# Patient Record
Sex: MALE | Race: WHITE | URBAN - METROPOLITAN AREA
[De-identification: names, ages, dates, MRNs, and addresses within clinical notes are randomized per-mention and may not be internally consistent; named-entity substitution may affect disease eponyms.]

---

## 2023-01-05 ENCOUNTER — OFFICE VISIT (OUTPATIENT)
Dept: URBAN - METROPOLITAN AREA CLINIC 89 | Facility: CLINIC | Age: 63
End: 2023-01-05
Payer: COMMERCIAL

## 2023-01-05 DIAGNOSIS — H57.11 OCULAR PAIN, RIGHT EYE: Primary | ICD-10-CM

## 2023-01-05 PROCEDURE — 92002 INTRM OPH EXAM NEW PATIENT: CPT | Performed by: OPTOMETRIST

## 2023-01-05 ASSESSMENT — INTRAOCULAR PRESSURE
OS: 19
OD: 30
OD: 19

## 2023-01-05 NOTE — IMPRESSION/PLAN
Impression: Ocular pain, right eye: H57.11. Plan: Due to significant discomfort and poor cooperation, will refer patient to Dr Radha Sneed at [facility] on [address] for possible B-scan.

## 2023-01-17 ENCOUNTER — OFFICE VISIT (OUTPATIENT)
Dept: URBAN - METROPOLITAN AREA CLINIC 88 | Facility: CLINIC | Age: 63
End: 2023-01-17
Payer: COMMERCIAL

## 2023-01-17 DIAGNOSIS — H10.45 OTHER CHRONIC ALLERGIC CONJUNCTIVITIS: ICD-10-CM

## 2023-01-17 DIAGNOSIS — H05.011 CELLULITIS OF RIGHT ORBIT: Primary | ICD-10-CM

## 2023-01-17 PROCEDURE — 99203 OFFICE O/P NEW LOW 30 MIN: CPT | Performed by: OPHTHALMOLOGY

## 2023-01-17 RX ORDER — AZITHROMYCIN DIHYDRATE 250 MG/1
250 MG TABLET, FILM COATED ORAL
Qty: 10 | Refills: 0 | Status: INACTIVE
Start: 2023-01-17 | End: 2023-01-21

## 2023-01-17 RX ORDER — TOBRAMYCIN 3 MG/ML
0.3 % SOLUTION/ DROPS OPHTHALMIC
Qty: 5 | Refills: 0 | Status: ACTIVE
Start: 2023-01-17

## 2023-01-17 ASSESSMENT — INTRAOCULAR PRESSURE
OD: 21
OS: 12

## 2023-01-17 NOTE — IMPRESSION/PLAN
Impression: Cellulitis of right orbit: H05.011. Plan: Discussed status with patient, patient was previously prescribed Maxitrol drops, pt had an allergic reaction to Maxitrol. D/C Maxitrol and start Tobramycin 03% OD QID OD X 10 days then D/C. Also start Z-pack 500mg X 1 day then 250mg X 4 days then D/C. sent electronically today. RTC if no improvement in symptoms.

## 2023-06-30 ENCOUNTER — OFFICE VISIT (OUTPATIENT)
Dept: URBAN - METROPOLITAN AREA CLINIC 89 | Facility: CLINIC | Age: 63
End: 2023-06-30
Payer: COMMERCIAL

## 2023-06-30 DIAGNOSIS — R23.1 PALLOR: ICD-10-CM

## 2023-06-30 DIAGNOSIS — D49.81 NEOPLASM OF UNSPECIFIED BEHAVIOR OF RETINA AND CHOROID: Primary | ICD-10-CM

## 2023-06-30 PROCEDURE — 92014 COMPRE OPH EXAM EST PT 1/>: CPT | Performed by: OPTOMETRIST

## 2023-06-30 ASSESSMENT — INTRAOCULAR PRESSURE
OS: 14
OD: 13

## 2023-06-30 NOTE — IMPRESSION/PLAN
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 Plan: Suspicious lesion 7:00 right eye. Discussed findings with patient. Refer to Vicky Square MD for further evaluation.

## 2023-07-11 ENCOUNTER — OFFICE VISIT (OUTPATIENT)
Dept: URBAN - METROPOLITAN AREA CLINIC 88 | Facility: CLINIC | Age: 63
End: 2023-07-11
Payer: COMMERCIAL

## 2023-07-11 DIAGNOSIS — H47.011 ISCHEMIC OPTIC NEUROPATHY, RIGHT EYE: ICD-10-CM

## 2023-07-11 DIAGNOSIS — H25.13 AGE-RELATED NUCLEAR CATARACT, BILATERAL: ICD-10-CM

## 2023-07-11 DIAGNOSIS — D31.61: Primary | ICD-10-CM

## 2023-07-11 PROCEDURE — 92250 FUNDUS PHOTOGRAPHY W/I&R: CPT | Performed by: OPHTHALMOLOGY

## 2023-07-11 PROCEDURE — 92134 CPTRZ OPH DX IMG PST SGM RTA: CPT | Performed by: OPHTHALMOLOGY

## 2023-07-11 PROCEDURE — 99213 OFFICE O/P EST LOW 20 MIN: CPT | Performed by: OPHTHALMOLOGY

## 2023-07-11 ASSESSMENT — INTRAOCULAR PRESSURE
OD: 17
OS: 13

## 2023-07-11 ASSESSMENT — VISUAL ACUITY
OS: 20/40
OD: 20/300

## 2023-07-11 NOTE — IMPRESSION/PLAN
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 Plan: Mild disc pallor on Optic disc. Observe.

## 2023-07-11 NOTE — IMPRESSION/PLAN
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 Plan: Discussed diagnosis in detail with patient. Per patient states his vision has gradually decrease x 5 months. Patient denies seeing a shadow. Patient is being referred to [facility]. Unfortunately there is no Provider in the [facility] that can treat this Ocular tumor. Patient was given the address and name of Doctor (Tara Cowan) and he will contact [facility] to get a secondary authorization for the patient to see the Retina Surgeon in [place].  Once he gets the approval from [place], we will send the referral and chart notes to [address] [place] [address]

## 2023-07-11 NOTE — IMPRESSION/PLAN
Impression: Age-related nuclear cataract, bilateral: H25.13. Plan: Discussed findings with patient and no treatment currently recommended. The patient will monitor vision changes and contact us with any decrease in vision.